# Patient Record
Sex: MALE | Race: WHITE | NOT HISPANIC OR LATINO | Employment: FULL TIME | URBAN - METROPOLITAN AREA
[De-identification: names, ages, dates, MRNs, and addresses within clinical notes are randomized per-mention and may not be internally consistent; named-entity substitution may affect disease eponyms.]

---

## 2024-01-31 ENCOUNTER — OCCMED (OUTPATIENT)
Dept: URGENT CARE | Facility: MEDICAL CENTER | Age: 39
End: 2024-01-31
Payer: OTHER MISCELLANEOUS

## 2024-01-31 ENCOUNTER — APPOINTMENT (OUTPATIENT)
Dept: RADIOLOGY | Facility: MEDICAL CENTER | Age: 39
End: 2024-01-31

## 2024-01-31 DIAGNOSIS — M25.511 ACUTE PAIN OF RIGHT SHOULDER: Primary | ICD-10-CM

## 2024-01-31 DIAGNOSIS — M25.511 ACUTE PAIN OF RIGHT SHOULDER: ICD-10-CM

## 2024-01-31 PROCEDURE — G0382 LEV 3 HOSP TYPE B ED VISIT: HCPCS

## 2024-01-31 PROCEDURE — 99283 EMERGENCY DEPT VISIT LOW MDM: CPT

## 2024-01-31 PROCEDURE — 73030 X-RAY EXAM OF SHOULDER: CPT

## 2024-02-14 ENCOUNTER — APPOINTMENT (OUTPATIENT)
Dept: URGENT CARE | Facility: MEDICAL CENTER | Age: 39
End: 2024-02-14
Payer: OTHER MISCELLANEOUS

## 2024-02-14 PROCEDURE — 99213 OFFICE O/P EST LOW 20 MIN: CPT

## 2024-02-29 VITALS
BODY MASS INDEX: 27.2 KG/M2 | DIASTOLIC BLOOD PRESSURE: 78 MMHG | WEIGHT: 190 LBS | HEIGHT: 70 IN | SYSTOLIC BLOOD PRESSURE: 118 MMHG

## 2024-02-29 DIAGNOSIS — S46.111A: Primary | ICD-10-CM

## 2024-02-29 PROCEDURE — 99204 OFFICE O/P NEW MOD 45 MIN: CPT | Performed by: ORTHOPAEDIC SURGERY

## 2024-02-29 NOTE — ASSESSMENT & PLAN NOTE
Findings consistent with traumatic rupture of proximal right long head biceps tendon due to a work-related injury on January 30, 2024.  Findings and treatment options were discussed with the patient.  X-rays and MRI were reviewed with him.  Discussed that this condition can heal on its own without surgical intervention.  He may lose about 5-10% of his strength overall and continue to have the deformity over the biceps muscle belly, but it should not affect his overall function.  There is a surgical option of repair of the biceps tendon, but it is not guaranteed that he will regain all of his strength even with surgery.  At this time patient would like to continue conservative treatment.  Recommend formal physical therapy to rehab the shoulder.  He was given work restrictions of no lifting greater than 20 pounds.  Will follow-up in 4 weeks for reevaluation.  All patient's questions were answered to his satisfaction.  This note is created using dictation transcription.  It may contain typographical errors, grammatical errors, improperly dictated words, background noise and other errors.

## 2024-02-29 NOTE — PROGRESS NOTES
Assessment:     1. Traumatic rupture of long head of right biceps brachii tendon        Plan:     Problem List Items Addressed This Visit          Musculoskeletal and Integument    Traumatic rupture of long head of right biceps brachii tendon - Primary     Findings consistent with traumatic rupture of proximal right long head biceps tendon due to a work-related injury on January 30, 2024.  Findings and treatment options were discussed with the patient.  X-rays and MRI were reviewed with him.  Discussed that this condition can heal on its own without surgical intervention.  He may lose about 5-10% of his strength overall and continue to have the deformity over the biceps muscle belly, but it should not affect his overall function.  There is a surgical option of repair of the biceps tendon, but it is not guaranteed that he will regain all of his strength even with surgery.  At this time patient would like to continue conservative treatment.  Recommend formal physical therapy to rehab the shoulder.  He was given work restrictions of no lifting greater than 20 pounds.  Will follow-up in 4 weeks for reevaluation.  All patient's questions were answered to his satisfaction.  This note is created using dictation transcription.  It may contain typographical errors, grammatical errors, improperly dictated words, background noise and other errors.         Relevant Orders    Ambulatory Referral to Physical Therapy      Subjective:     Patient ID: Huan Basurto is a 38 y.o. male.  Chief Complaint:  This is a right-hand-dominant 38-year-old white male here for evaluation of his right shoulder.  He experienced a work-related injury on January 30, 2024.  He works as an excavator.  He states he was trying to  a [place] barrier that was filled with water with his arms outstretched to his sides.  When he picked up the barrier he felt several pops in his right shoulder followed by pain.  He then did notice a deformity of  "his biceps muscle.  He was seen by occupational medicine and x-rays were taken.  He was sent for an MRI of the right shoulder.  No other treatment.  He states his pain has improved significantly since his injury.  He is working light duty but has started doing heavier activities such as shoveling with no increased pain.  He denies any prior injury to the right shoulder.  Patient intake form reviewed.    Allergy:  Allergies   Allergen Reactions    Bee Venom Anaphylaxis     [facility] - [id number]: throat swelling  Yellow jackets     Medications:  all current active meds have been reviewed  Past Medical History:  History reviewed. No pertinent past medical history.  Past Surgical History:  History reviewed. No pertinent surgical history.  Family History:  History reviewed. No pertinent family history.  Social History:  Social History     Substance and Sexual Activity   Alcohol Use None     Social History     Substance and Sexual Activity   Drug Use Not on file     Social History     Tobacco Use   Smoking Status Former    Types: Cigarettes   Smokeless Tobacco Never     Review of Systems   Constitutional: Negative.    HENT: Negative.     Eyes: Negative.    Respiratory: Negative.     Cardiovascular: Negative.    Gastrointestinal: Negative.    Endocrine: Negative.    Genitourinary: Negative.    Musculoskeletal:  Positive for arthralgias (right shoulder).   Skin: Negative.    Neurological: Negative.    Hematological: Negative.    Psychiatric/Behavioral: Negative.           Objective:  BP Readings from Last 1 Encounters:   02/29/24 118/78      Wt Readings from Last 1 Encounters:   02/29/24 86.2 kg (190 lb)      BMI:   Estimated body mass index is 27.26 kg/m² as calculated from the following:    Height as of this encounter: 5' 10\" (1.778 m).    Weight as of this encounter: 86.2 kg (190 lb).  BSA:   Estimated body surface area is 2.04 meters squared as calculated from the following:    Height as of this encounter: 5' 10\" " (1.778 m).    Weight as of this encounter: 86.2 kg (190 lb).   Physical Exam  Constitutional:       General: He is not in acute distress.     Appearance: He is well-developed.   HENT:      Head: Normocephalic.   Eyes:      Conjunctiva/sclera: Conjunctivae normal.      Pupils: Pupils are equal, round, and reactive to light.   Pulmonary:      Effort: Pulmonary effort is normal. No respiratory distress.   Skin:     General: Skin is warm and dry.   Neurological:      Mental Status: He is alert and oriented to person, place, and time.   Psychiatric:         Behavior: Behavior normal.       Right Shoulder Exam     Tenderness   The patient is experiencing tenderness in the biceps tendon.    Range of Motion   The patient has normal right shoulder ROM.  Right shoulder active abduction: pain at end range.   Internal rotation 0 degrees:  normal Right shoulder internal rotation 0 degrees: pain at end range.    Muscle Strength   The patient has normal right shoulder strength.  Abduction: 5/5   Internal rotation: 5/5   External rotation: 5/5   Biceps: 5/5     Tests   Quintanilla test: negative  Cross arm: negative  Impingement: negative  Drop arm: negative  Sulcus: absent    Other   Erythema: absent  Scars: absent  Sensation: normal  Pulse: present    Comments:  Dante deformity  Negative empty can  Positive speeds  Pain in shoulder with resisted elbow flexion            I have personally reviewed pertinent films in PACS and my interpretation is x-rays of the right shoulder reveal no bony abnormalities.  MRI of the right shoulder reveals a rupture of the long head of the proximal biceps tendon.    Scribe Attestation      I,:  Angeles Chinchilla PA-C am acting as a scribe while in the presence of the attending physician.:       I,:  Simone Wei MD personally performed the services described in this documentation    as scribed in my presence.:

## 2024-04-05 ENCOUNTER — TELEPHONE (OUTPATIENT)
Age: 39
End: 2024-04-05

## 2024-04-05 NOTE — TELEPHONE ENCOUNTER
Caller: Ciara at [facility], w/c    Doctor: Sanjuana    Reason for call:     Checking on appointments, last one was canceled no new appts, confirmed    Call back#: n/a